# Patient Record
Sex: FEMALE | Race: WHITE | Employment: OTHER | URBAN - NONMETROPOLITAN AREA
[De-identification: names, ages, dates, MRNs, and addresses within clinical notes are randomized per-mention and may not be internally consistent; named-entity substitution may affect disease eponyms.]

---

## 2017-04-20 ENCOUNTER — TELEPHONE (OUTPATIENT)
Dept: OBGYN | Age: 62
End: 2017-04-20

## 2017-04-20 DIAGNOSIS — Z12.31 ENCOUNTER FOR SCREENING MAMMOGRAM FOR BREAST CANCER: Primary | ICD-10-CM

## 2017-05-23 ENCOUNTER — OFFICE VISIT (OUTPATIENT)
Dept: OBGYN | Age: 62
End: 2017-05-23
Payer: COMMERCIAL

## 2017-05-23 VITALS
DIASTOLIC BLOOD PRESSURE: 77 MMHG | WEIGHT: 197.5 LBS | BODY MASS INDEX: 31 KG/M2 | SYSTOLIC BLOOD PRESSURE: 122 MMHG | HEIGHT: 67 IN | HEART RATE: 62 BPM

## 2017-05-23 DIAGNOSIS — Z01.419 ENCOUNTER FOR GYNECOLOGICAL EXAMINATION (GENERAL) (ROUTINE) WITHOUT ABNORMAL FINDINGS: Primary | ICD-10-CM

## 2017-05-23 PROCEDURE — 99396 PREV VISIT EST AGE 40-64: CPT

## 2017-05-23 RX ORDER — FAMCICLOVIR 250 MG/1
TABLET, FILM COATED ORAL
COMMUNITY
Start: 2017-04-13

## 2017-05-23 RX ORDER — TOFACITINIB 11 MG/1
TABLET, FILM COATED, EXTENDED RELEASE ORAL
COMMUNITY
Start: 2017-04-14

## 2017-05-23 ASSESSMENT — ENCOUNTER SYMPTOMS
COLOR CHANGE: 0
CONSTIPATION: 0
DIARRHEA: 0
BACK PAIN: 0
TROUBLE SWALLOWING: 0
SHORTNESS OF BREATH: 0
BLOOD IN STOOL: 0
COUGH: 0

## 2017-06-08 ENCOUNTER — TRANSCRIBE ORDERS (OUTPATIENT)
Dept: ADMINISTRATIVE | Facility: HOSPITAL | Age: 62
End: 2017-06-08

## 2017-06-08 DIAGNOSIS — Z12.31 ENCOUNTER FOR SCREENING MAMMOGRAM FOR MALIGNANT NEOPLASM OF BREAST: Primary | ICD-10-CM

## 2017-06-13 ENCOUNTER — HOSPITAL ENCOUNTER (OUTPATIENT)
Dept: MAMMOGRAPHY | Facility: HOSPITAL | Age: 62
Discharge: HOME | End: 2017-06-13

## 2017-06-13 DIAGNOSIS — Z12.31 ENCOUNTER FOR SCREENING MAMMOGRAM FOR MALIGNANT NEOPLASM OF BREAST: ICD-10-CM

## 2017-06-13 PROCEDURE — 77063 BREAST TOMOSYNTHESIS BI: CPT

## 2017-06-13 PROCEDURE — G0202 SCR MAMMO BI INCL CAD: HCPCS

## 2018-08-31 ENCOUNTER — TRANSCRIBE ORDERS (OUTPATIENT)
Dept: ADMINISTRATIVE | Facility: HOSPITAL | Age: 63
End: 2018-08-31

## 2018-08-31 DIAGNOSIS — Z12.31 ENCOUNTER FOR SCREENING MAMMOGRAM FOR MALIGNANT NEOPLASM OF BREAST: Primary | ICD-10-CM

## 2018-09-07 ENCOUNTER — HOSPITAL ENCOUNTER (OUTPATIENT)
Dept: MAMMOGRAPHY | Facility: HOSPITAL | Age: 63
Discharge: HOME | End: 2018-09-07
Admitting: NURSE PRACTITIONER

## 2018-09-07 DIAGNOSIS — Z12.31 ENCOUNTER FOR SCREENING MAMMOGRAM FOR MALIGNANT NEOPLASM OF BREAST: ICD-10-CM

## 2018-09-07 PROCEDURE — 77063 BREAST TOMOSYNTHESIS BI: CPT

## 2018-09-07 PROCEDURE — 77067 SCR MAMMO BI INCL CAD: CPT

## 2018-10-01 ENCOUNTER — OFFICE VISIT (OUTPATIENT)
Dept: OBSTETRICS AND GYNECOLOGY | Facility: CLINIC | Age: 63
End: 2018-10-01

## 2018-10-01 VITALS
SYSTOLIC BLOOD PRESSURE: 112 MMHG | WEIGHT: 181 LBS | DIASTOLIC BLOOD PRESSURE: 68 MMHG | HEIGHT: 67 IN | BODY MASS INDEX: 28.41 KG/M2

## 2018-10-01 DIAGNOSIS — N89.8 VAGINAL DRYNESS: ICD-10-CM

## 2018-10-01 DIAGNOSIS — Z01.419 WELL WOMAN EXAM WITH ROUTINE GYNECOLOGICAL EXAM: Primary | ICD-10-CM

## 2018-10-01 DIAGNOSIS — F41.9 ANXIETY: ICD-10-CM

## 2018-10-01 DIAGNOSIS — N81.11 CYSTOCELE, MIDLINE: ICD-10-CM

## 2018-10-01 DIAGNOSIS — R21 RASH: ICD-10-CM

## 2018-10-01 DIAGNOSIS — Z80.0 FAMILY HISTORY OF COLON CANCER: ICD-10-CM

## 2018-10-01 PROCEDURE — 99396 PREV VISIT EST AGE 40-64: CPT | Performed by: NURSE PRACTITIONER

## 2018-10-01 PROCEDURE — 99203 OFFICE O/P NEW LOW 30 MIN: CPT | Performed by: NURSE PRACTITIONER

## 2018-10-01 PROCEDURE — G0123 SCREEN CERV/VAG THIN LAYER: HCPCS | Performed by: NURSE PRACTITIONER

## 2018-10-01 RX ORDER — BISOPROLOL FUMARATE AND HYDROCHLOROTHIAZIDE 5; 6.25 MG/1; MG/1
TABLET ORAL
COMMUNITY
Start: 2013-05-20

## 2018-10-01 RX ORDER — SIMVASTATIN 80 MG
TABLET ORAL
COMMUNITY
Start: 2013-09-25

## 2018-10-01 RX ORDER — NYSTATIN AND TRIAMCINOLONE ACETONIDE 100000; 1 [USP'U]/G; MG/G
OINTMENT TOPICAL 2 TIMES DAILY
Qty: 1 BOTTLE | Refills: 3 | Status: SHIPPED | OUTPATIENT
Start: 2018-10-01 | End: 2022-04-13

## 2018-10-01 RX ORDER — ERGOCALCIFEROL (VITAMIN D2) 10 MCG
TABLET ORAL
COMMUNITY

## 2018-10-01 RX ORDER — ALPRAZOLAM 0.25 MG/1
0.25 TABLET ORAL 3 TIMES DAILY PRN
Qty: 90 TABLET | Refills: 0 | Status: SHIPPED | OUTPATIENT
Start: 2018-10-01 | End: 2022-02-02

## 2018-10-01 RX ORDER — ESOMEPRAZOLE MAGNESIUM 40 MG/1
40 CAPSULE, DELAYED RELEASE ORAL
COMMUNITY

## 2018-10-01 RX ORDER — FERROUS SULFATE 325(65) MG
325 TABLET ORAL
COMMUNITY

## 2018-10-01 RX ORDER — LEVOTHYROXINE SODIUM 0.03 MG/1
TABLET ORAL
COMMUNITY
End: 2022-04-13

## 2018-10-01 RX ORDER — FAMCICLOVIR 250 MG/1
TABLET ORAL
COMMUNITY
Start: 2017-04-13 | End: 2022-02-02

## 2018-10-01 NOTE — PROGRESS NOTES
Subjective     Carissa Becerril is a 63 y.o. female    Here for yearly check up. She has C/O vaginal dryness. She also has a rash around the rectal area. She also has C/O insomnia and difficulty staying asleep. She has some anxiety associated with it.      Gynecologic Exam   The patient's primary symptoms include a genital rash. The patient's pertinent negatives include no pelvic pain, vaginal bleeding or vaginal discharge. Associated symptoms include rash. Pertinent negatives include no abdominal pain, anorexia, back pain, chills, constipation, diarrhea, discolored urine, dysuria, fever, flank pain, frequency, headaches, hematuria, joint pain, joint swelling, nausea, painful intercourse, sore throat, urgency or vomiting. She is sexually active. She is postmenopausal.   Other   Chronicity: Rash. The current episode started today. Associated symptoms include a rash. Pertinent negatives include no abdominal pain, anorexia, arthralgias, change in bowel habit, chest pain, chills, congestion, coughing, diaphoresis, fatigue, fever, headaches, joint swelling, myalgias, nausea, neck pain, numbness, sore throat, swollen glands, urinary symptoms, vertigo, visual change, vomiting or weakness. Nothing aggravates the symptoms. She has tried nothing for the symptoms.   Anxiety   Presents for initial visit. Onset was 1 to 6 months ago. The problem has been waxing and waning. Symptoms include insomnia. Patient reports no chest pain, compulsions, confusion, decreased concentration, depressed mood, dizziness, dry mouth, excessive worry, feeling of choking, hyperventilation, impotence, irritability, malaise, muscle tension, nausea, nervous/anxious behavior, obsessions, palpitations, panic, restlessness, shortness of breath or suicidal ideas. Symptoms occur most days. The severity of symptoms is mild. Nothing aggravates the symptoms. The quality of sleep is poor. Nighttime awakenings: several.     There are no known risk factors.  "        /68   Ht 170.2 cm (67\")   Wt 82.1 kg (181 lb)   LMP 10/01/2009 (Approximate)   BMI 28.35 kg/m²     Outpatient Encounter Prescriptions as of 10/1/2018   Medication Sig Dispense Refill   • aspirin 81 MG tablet Take 81 mg by mouth.     • bisoprolol-hydrochlorothiazide (ZIAC) 5-6.25 MG per tablet Take  by mouth.     • Calcium Carbonate 1500 (600 Ca) MG tablet Take 600 mg by mouth.     • esomeprazole (nexIUM) 40 MG capsule Take 40 mg by mouth.     • famciclovir (FAMVIR) 250 MG tablet      • ferrous sulfate 325 (65 FE) MG tablet Take 325 mg by mouth Daily With Breakfast.     • levothyroxine (SYNTHROID, LEVOTHROID) 25 MCG tablet Take  by mouth.     • simvastatin (ZOCOR) 80 MG tablet TAKE ONE-HALF TABLET BY MOUTH NIGHTLY     • Tofacitinib Citrate (XELJANZ XR) 11 MG tablet sustained-release 24 hour      • Vitamin D, Cholecalciferol, (CHOLECALCIFEROL) 400 units tablet Take  by mouth.     • ALPRAZolam (XANAX) 0.25 MG tablet Take 1 tablet by mouth 3 (Three) Times a Day As Needed for Anxiety. 90 tablet 0   • nystatin-triamcinolone (MYCOLOG) 804122-3.1 UNIT/GM-% ointment Apply  topically to the appropriate area as directed 2 (Two) Times a Day. 1 bottle 3     No facility-administered encounter medications on file as of 10/1/2018.        Surgical History  Past Surgical History:   Procedure Laterality Date   • COLONOSCOPY  2017   • DILATATION AND CURETTAGE     • TONSILLECTOMY     • TUBAL ABDOMINAL LIGATION     • WISDOM TOOTH EXTRACTION         Family History  Family History   Problem Relation Age of Onset   • Diabetes Father    • Heart disease Mother    • No Known Problems Brother    • Colon cancer Maternal Grandmother 80   • Colon cancer Cousin 18   • Breast cancer Neg Hx    • Ovarian cancer Neg Hx    • Uterine cancer Neg Hx    • Melanoma Neg Hx    • Prostate cancer Neg Hx        The following portions of the patient's history were reviewed and updated as appropriate: allergies, current medications, past family " history, past medical history, past social history, past surgical history and problem list.    Review of Systems   Constitutional: Negative for activity change, appetite change, chills, diaphoresis, fatigue, fever, irritability, unexpected weight gain and unexpected weight loss.   HENT: Negative for congestion, dental problem, drooling, ear discharge, ear pain, facial swelling, hearing loss, mouth sores, nosebleeds, postnasal drip, rhinorrhea, sinus pressure, sneezing, sore throat, tinnitus, trouble swallowing and voice change.    Eyes: Negative for blurred vision, double vision, photophobia, pain, discharge, redness, itching and visual disturbance.   Respiratory: Negative for apnea, cough, choking, chest tightness, shortness of breath, wheezing and stridor.    Cardiovascular: Negative for chest pain, palpitations and leg swelling.   Gastrointestinal: Negative for abdominal distention, abdominal pain, anal bleeding, anorexia, blood in stool, change in bowel habit, constipation, diarrhea, nausea, rectal pain, vomiting, GERD and indigestion.   Endocrine: Negative for cold intolerance, heat intolerance, polydipsia, polyphagia and polyuria.   Genitourinary: Negative for breast discharge, urinary incontinence, decreased libido, decreased urine volume, difficulty urinating, dyspareunia, dysuria, flank pain, frequency, genital sores, hematuria, impotence, menstrual problem, pelvic pain, urgency, vaginal bleeding, vaginal discharge, vaginal pain, breast lump and breast pain.   Musculoskeletal: Negative for arthralgias, back pain, gait problem, joint pain, joint swelling, myalgias, neck pain, neck stiffness and bursitis.   Skin: Positive for rash. Negative for color change, dry skin, pallor, skin lesions and bruise.   Allergic/Immunologic: Negative for environmental allergies, food allergies and immunocompromised state.   Neurological: Negative for dizziness, vertigo, tremors, seizures, syncope, facial asymmetry, speech  difficulty, weakness, light-headedness, numbness, headache, memory problem and confusion.   Hematological: Negative for adenopathy. Does not bruise/bleed easily.   Psychiatric/Behavioral: Positive for sleep disturbance. Negative for agitation, behavioral problems, decreased concentration, dysphoric mood, hallucinations, self-injury, suicidal ideas, negative for hyperactivity, depressed mood and stress. The patient has insomnia. The patient is not nervous/anxious.        Objective   Physical Exam   Constitutional: She is oriented to person, place, and time. She appears well-developed and well-nourished. No distress.   HENT:   Head: Normocephalic.   Right Ear: External ear normal.   Left Ear: External ear normal.   Nose: Nose normal.   Mouth/Throat: Oropharynx is clear and moist.   Eyes: Conjunctivae are normal. Right eye exhibits no discharge. Left eye exhibits no discharge. No scleral icterus.   Neck: Normal range of motion. Neck supple. Carotid bruit is not present. No tracheal deviation present. No thyromegaly present.   Cardiovascular: Normal rate, regular rhythm, normal heart sounds and intact distal pulses.    No murmur heard.  Pulmonary/Chest: Effort normal and breath sounds normal. No respiratory distress. She has no wheezes. Right breast exhibits no inverted nipple, no mass, no nipple discharge, no skin change and no tenderness. Left breast exhibits no inverted nipple, no mass, no nipple discharge, no skin change and no tenderness. Breasts are symmetrical. There is no breast swelling.   Abdominal: Soft. She exhibits no distension and no mass. There is no tenderness. There is no guarding. No hernia. Hernia confirmed negative in the right inguinal area and confirmed negative in the left inguinal area.   Genitourinary: Rectum normal, vagina normal and uterus normal. Rectal exam shows no mass.       No breast tenderness, discharge or bleeding. Pelvic exam was performed with patient supine. There is no rash,  tenderness, lesion or injury on the right labia. There is no rash, tenderness, lesion or injury on the left labia. Uterus is not enlarged, not fixed and not tender. Cervix exhibits no motion tenderness, no discharge and no friability. Right adnexum displays no mass, no tenderness and no fullness. Left adnexum displays no mass, no tenderness and no fullness. No erythema, tenderness or bleeding in the vagina. No foreign body in the vagina. No signs of injury around the vagina. No vaginal discharge found.   Genitourinary Comments:   BSU normal  Urethral meatus  Normal  Perineum  Normal   Musculoskeletal: Normal range of motion. She exhibits no edema or tenderness.   Lymphadenopathy:        Head (right side): No submental, no submandibular, no tonsillar, no preauricular, no posterior auricular and no occipital adenopathy present.        Head (left side): No submental, no submandibular, no tonsillar, no preauricular, no posterior auricular and no occipital adenopathy present.     She has no cervical adenopathy.        Right cervical: No superficial cervical, no deep cervical and no posterior cervical adenopathy present.       Left cervical: No superficial cervical, no deep cervical and no posterior cervical adenopathy present.     She has no axillary adenopathy.        Right: No inguinal adenopathy present.        Left: No inguinal adenopathy present.   Neurological: She is alert and oriented to person, place, and time. Coordination normal.   Skin: Skin is warm and dry. No bruising and no rash noted. She is not diaphoretic. No erythema.   Psychiatric: She has a normal mood and affect. Her behavior is normal. Judgment and thought content normal.   Nursing note and vitals reviewed.      Assessment/Plan   Carissa was seen today for gynecologic exam.    Diagnoses and all orders for this visit:    Well woman exam with routine gynecological exam  Normal GYN exam. Will have lab work at PCP.  Encouraged SBE, pt is aware how to do  self breast exam and the importance of same. Discussed weight management and importance of maintaining a healthy weight. Discussed Vitamin D intake and the importance of adequate vitamin D for both Bone Health and a healthy immune system.  Discussed Daily exercise and the importance of same, in regards to a healthy heart as well as helping to maintain her weight and improving her mental health.  BMI  28.3. Colonoscopy is up to date.  Mammogram was already done. Pap smear is done per [facility]CCP guidelines.  -     Liquid-based Pap Smear, Screening    Vaginal dryness  Comments:  Pt will try Coconut Oil daily.    Family history of colon cancer  Pt has a family history of colon  Cancer. We discussed [facility] Genetic screening that can be done to see if she carries the Gene for Breast, Ovarian, Colon, Melanoma, Uterine and Pancreatic Cancers. We discussed if positive she will have free Genetic counseling through [facility]. We also discussed if she does have the positive gene she can have more testing yearly and even surgery if desired.    Rash  Comments:  Pt has a rash in the groin folds and around her buttocks. Will try Mycolog cream.  Orders:  -     nystatin-triamcinolone (MYCOLOG) 146073-6.1 UNIT/GM-% ointment; Apply  topically to the appropriate area as directed 2 (Two) Times a Day.    Anxiety  Comments:  Pt has some anxiety at night and has difficulty staying asleep. Has tried Ambien in the past and it did not help. Will try low dose Xanax. jonny obtained and S  Orders:  -     ALPRAZolam (XANAX) 0.25 MG tablet; Take 1 tablet by mouth 3 (Three) Times a Day As Needed for Anxiety.    Cystocele, midline  Comments:  Pt has a mild cystocele. Encouraged Kegel exercises.          Patient's Body mass index is 28.35 kg/m². BMI is above normal parameters. Recommendations include: educational material, exercise counseling and nutrition counseling.      Patria Nguyen, APRN  10/1/2018

## 2018-10-01 NOTE — PATIENT INSTRUCTIONS

## 2018-10-01 NOTE — PROGRESS NOTES
Attempted to obtain health maintenance information, patient unable to provide answers for the following items Pneumococcal Vaccine, Medicare Annual Wellness Exam, Lipid Panel, Diabetic Eye Exam, Diabetic Foot Exam, HPV Vaccine, Chlamydia Screening , HgbA1c and Zoster Vaccine.

## 2018-10-02 ENCOUNTER — TELEPHONE (OUTPATIENT)
Dept: OBSTETRICS AND GYNECOLOGY | Facility: CLINIC | Age: 63
End: 2018-10-02

## 2018-10-02 LAB
GEN CATEG CVX/VAG CYTO-IMP: NORMAL
LAB AP CASE REPORT: NORMAL
LAB AP GYN ADDITIONAL INFORMATION: NORMAL
LAB AP GYN OTHER FINDINGS: NORMAL
PATH INTERP SPEC-IMP: NORMAL
STAT OF ADQ CVX/VAG CYTO-IMP: NORMAL

## 2018-10-02 NOTE — TELEPHONE ENCOUNTER
----- Message from CAMILLE Godfrey sent at 10/2/2018  2:38 PM CDT -----  Normal pap. TRC  ---Pt informed pap normal.

## 2019-12-13 ENCOUNTER — TRANSCRIBE ORDERS (OUTPATIENT)
Dept: ADMINISTRATIVE | Facility: HOSPITAL | Age: 64
End: 2019-12-13

## 2019-12-13 DIAGNOSIS — Z12.31 SCREENING MAMMOGRAM FOR HIGH-RISK PATIENT: Primary | ICD-10-CM

## 2019-12-19 ENCOUNTER — APPOINTMENT (OUTPATIENT)
Dept: MAMMOGRAPHY | Facility: HOSPITAL | Age: 64
End: 2019-12-19

## 2019-12-23 ENCOUNTER — HOSPITAL ENCOUNTER (OUTPATIENT)
Dept: MAMMOGRAPHY | Facility: HOSPITAL | Age: 64
Discharge: HOME | End: 2019-12-23
Admitting: NURSE PRACTITIONER

## 2019-12-23 PROCEDURE — 77067 SCR MAMMO BI INCL CAD: CPT

## 2019-12-23 PROCEDURE — 77063 BREAST TOMOSYNTHESIS BI: CPT

## 2020-01-02 ENCOUNTER — OFFICE VISIT (OUTPATIENT)
Dept: OBSTETRICS AND GYNECOLOGY | Facility: CLINIC | Age: 65
End: 2020-01-02

## 2020-01-02 VITALS
HEIGHT: 67 IN | DIASTOLIC BLOOD PRESSURE: 76 MMHG | BODY MASS INDEX: 32.65 KG/M2 | WEIGHT: 208 LBS | SYSTOLIC BLOOD PRESSURE: 136 MMHG

## 2020-01-02 DIAGNOSIS — N81.4 CYSTOCELE WITH PROLAPSE: ICD-10-CM

## 2020-01-02 DIAGNOSIS — N89.8 VAGINAL DRYNESS: ICD-10-CM

## 2020-01-02 DIAGNOSIS — Z12.31 ENCOUNTER FOR SCREENING MAMMOGRAM FOR BREAST CANCER: ICD-10-CM

## 2020-01-02 DIAGNOSIS — M05.9 RHEUMATOID ARTHRITIS WITH POSITIVE RHEUMATOID FACTOR, INVOLVING UNSPECIFIED SITE (HCC): ICD-10-CM

## 2020-01-02 DIAGNOSIS — Z01.419 WELL WOMAN EXAM WITH ROUTINE GYNECOLOGICAL EXAM: Primary | ICD-10-CM

## 2020-01-02 PROCEDURE — G0123 SCREEN CERV/VAG THIN LAYER: HCPCS | Performed by: NURSE PRACTITIONER

## 2020-01-02 PROCEDURE — 99396 PREV VISIT EST AGE 40-64: CPT | Performed by: NURSE PRACTITIONER

## 2020-01-02 PROCEDURE — 87624 HPV HI-RISK TYP POOLED RSLT: CPT | Performed by: NURSE PRACTITIONER

## 2020-01-02 RX ORDER — SUVOREXANT 15 MG/1
1 TABLET, FILM COATED ORAL NIGHTLY PRN
COMMUNITY
Start: 2019-10-23 | End: 2022-04-13

## 2020-01-02 RX ORDER — DICLOFENAC SODIUM 75 MG/1
75 TABLET, DELAYED RELEASE ORAL 2 TIMES DAILY
COMMUNITY
Start: 2019-12-14 | End: 2020-01-02

## 2020-01-02 NOTE — PATIENT INSTRUCTIONS

## 2020-01-02 NOTE — PROGRESS NOTES
"Subjective     Carissa Becerril is a 64 y.o. female    Patient comes in today for yearly checkup.  She has no complaints.    Gynecologic Exam   The patient's pertinent negatives include no pelvic pain, vaginal bleeding or vaginal discharge. The patient is experiencing no pain. Pertinent negatives include no abdominal pain, anorexia, back pain, chills, constipation, diarrhea, discolored urine, dysuria, fever, flank pain, frequency, headaches, hematuria, joint pain, joint swelling, nausea, painful intercourse, rash, sore throat, urgency or vomiting. She is sexually active. She is postmenopausal.         /76   Ht 170.2 cm (67\")   Wt 94.3 kg (208 lb)   LMP 10/01/2009 (Approximate)   Breastfeeding No   BMI 32.58 kg/m²     Outpatient Encounter Medications as of 1/2/2020   Medication Sig Dispense Refill   • aspirin 81 MG tablet Take 81 mg by mouth.     • BELSOMRA 15 MG tablet Take 1 tablet by mouth At Night As Needed.     • bisoprolol-hydrochlorothiazide (ZIAC) 5-6.25 MG per tablet Take  by mouth.     • Calcium Carbonate 1500 (600 Ca) MG tablet Take 600 mg by mouth.     • esomeprazole (nexIUM) 40 MG capsule Take 40 mg by mouth.     • ferrous sulfate 325 (65 FE) MG tablet Take 325 mg by mouth Daily With Breakfast.     • levothyroxine (SYNTHROID, LEVOTHROID) 25 MCG tablet Take  by mouth.     • Multiple Vitamins-Minerals (PRESERVISION AREDS 2+MULTI VIT PO) Take  by mouth.     • simvastatin (ZOCOR) 80 MG tablet TAKE ONE-HALF TABLET BY MOUTH NIGHTLY     • Tofacitinib Citrate (XELJANZ XR) 11 MG tablet sustained-release 24 hour      • Vitamin D, Cholecalciferol, (CHOLECALCIFEROL) 400 units tablet Take  by mouth.     • ALPRAZolam (XANAX) 0.25 MG tablet Take 1 tablet by mouth 3 (Three) Times a Day As Needed for Anxiety. 90 tablet 0   • famciclovir (FAMVIR) 250 MG tablet      • nystatin-triamcinolone (MYCOLOG) 195523-0.1 UNIT/GM-% ointment Apply  topically to the appropriate area as directed 2 (Two) Times a Day. 1 bottle 3 "   • [DISCONTINUED] diclofenac (VOLTAREN) 75 MG EC tablet Take 75 mg by mouth 2 (Two) Times a Day.       No facility-administered encounter medications on file as of 1/2/2020.        Surgical History  Past Surgical History:   Procedure Laterality Date   • COLONOSCOPY  2017   • DILATATION AND CURETTAGE     • TONSILLECTOMY     • TUBAL ABDOMINAL LIGATION     • WISDOM TOOTH EXTRACTION         Family History  Family History   Problem Relation Age of Onset   • Diabetes Father    • Heart disease Mother    • No Known Problems Brother    • Colon cancer Maternal Grandmother 80   • Colon cancer Cousin 18   • Breast cancer Neg Hx    • Ovarian cancer Neg Hx    • Uterine cancer Neg Hx    • Melanoma Neg Hx    • Prostate cancer Neg Hx        The following portions of the patient's history were reviewed and updated as appropriate: allergies, current medications, past family history, past medical history, past social history, past surgical history and problem list.    Review of Systems   Constitutional: Negative for activity change, appetite change, chills, diaphoresis, fatigue, fever, unexpected weight gain and unexpected weight loss.   HENT: Negative for congestion, dental problem, drooling, ear discharge, ear pain, facial swelling, hearing loss, mouth sores, nosebleeds, postnasal drip, rhinorrhea, sinus pressure, sneezing, sore throat, swollen glands, tinnitus, trouble swallowing and voice change.    Eyes: Negative for blurred vision, double vision, photophobia, pain, discharge, redness, itching and visual disturbance.   Respiratory: Negative for apnea, cough, choking, chest tightness, shortness of breath, wheezing and stridor.    Cardiovascular: Negative for chest pain, palpitations and leg swelling.   Gastrointestinal: Negative for abdominal distention, abdominal pain, anal bleeding, anorexia, blood in stool, constipation, diarrhea, nausea, rectal pain, vomiting, GERD and indigestion.   Endocrine: Negative for cold intolerance,  heat intolerance, polydipsia, polyphagia and polyuria.   Genitourinary: Negative for amenorrhea, breast discharge, breast lump, breast pain, decreased libido, decreased urine volume, difficulty urinating, dyspareunia, dysuria, flank pain, frequency, genital sores, hematuria, menstrual problem, pelvic pain, pelvic pressure, urgency, urinary incontinence, vaginal bleeding, vaginal discharge and vaginal pain.   Musculoskeletal: Negative for arthralgias, back pain, gait problem, joint pain, joint swelling, myalgias, neck pain, neck stiffness and bursitis.   Skin: Negative for color change, dry skin and rash.   Allergic/Immunologic: Negative for environmental allergies, food allergies and immunocompromised state.   Neurological: Negative for dizziness, tremors, seizures, syncope, facial asymmetry, speech difficulty, weakness, light-headedness, numbness, headache, memory problem and confusion.   Hematological: Negative for adenopathy. Does not bruise/bleed easily.   Psychiatric/Behavioral: Negative for agitation, behavioral problems, decreased concentration, dysphoric mood, hallucinations, self-injury, sleep disturbance, suicidal ideas, negative for hyperactivity, depressed mood and stress. The patient is not nervous/anxious.        Objective   Physical Exam   Constitutional: She is oriented to person, place, and time. She appears well-developed and well-nourished. No distress.   HENT:   Head: Normocephalic.   Right Ear: External ear normal.   Left Ear: External ear normal.   Nose: Nose normal.   Mouth/Throat: Oropharynx is clear and moist.   Eyes: Conjunctivae are normal. Right eye exhibits no discharge. Left eye exhibits no discharge. No scleral icterus.   Neck: Normal range of motion. Neck supple. Carotid bruit is not present. No tracheal deviation present. No thyromegaly present.   Cardiovascular: Normal rate, regular rhythm, normal heart sounds and intact distal pulses.   No murmur heard.  Pulmonary/Chest: Effort  normal and breath sounds normal. No respiratory distress. She has no wheezes. Right breast exhibits no inverted nipple, no mass, no nipple discharge, no skin change and no tenderness. Left breast exhibits no inverted nipple, no mass, no nipple discharge, no skin change and no tenderness. No breast swelling, tenderness, discharge or bleeding. Breasts are symmetrical.   Abdominal: Soft. She exhibits no distension and no mass. There is no tenderness. There is no guarding. No hernia. Hernia confirmed negative in the right inguinal area and confirmed negative in the left inguinal area.   Genitourinary: Rectum normal, vagina normal and uterus normal. Rectal exam shows no mass. No breast swelling, tenderness, discharge or bleeding. Pelvic exam was performed with patient supine. There is no rash, tenderness, lesion or injury on the right labia. There is no rash, tenderness, lesion or injury on the left labia. Uterus is not enlarged, not fixed and not tender. Cervix exhibits no motion tenderness, no discharge and no friability. Right adnexum displays no mass, no tenderness and no fullness. Left adnexum displays no mass, no tenderness and no fullness. No erythema, tenderness or bleeding in the vagina. No foreign body in the vagina. No signs of injury around the vagina. No vaginal discharge found.   Genitourinary Comments:   BSU normal  Urethral meatus  Normal  Perineum  Normal  Patient has a moderate cystocele   Musculoskeletal: Normal range of motion. She exhibits no edema or tenderness.   Lymphadenopathy:        Head (right side): No submental, no submandibular, no tonsillar, no preauricular, no posterior auricular and no occipital adenopathy present.        Head (left side): No submental, no submandibular, no tonsillar, no preauricular, no posterior auricular and no occipital adenopathy present.     She has no cervical adenopathy.        Right cervical: No superficial cervical, no deep cervical and no posterior cervical  adenopathy present.       Left cervical: No superficial cervical, no deep cervical and no posterior cervical adenopathy present.     She has no axillary adenopathy.        Right: No inguinal adenopathy present.        Left: No inguinal adenopathy present.   Neurological: She is alert and oriented to person, place, and time. Coordination normal.   Skin: Skin is warm and dry. No bruising and no rash noted. She is not diaphoretic. No erythema.   Psychiatric: She has a normal mood and affect. Her behavior is normal. Judgment and thought content normal.   Nursing note and vitals reviewed.      Assessment/Plan   Carissa was seen today for gynecologic exam.    Diagnoses and all orders for this visit:    Well woman exam with routine gynecological exam  Normal GYN exam. Will have lab work at PCP. Encouraged SBE, pt is aware how to do self breast exam and the importance of same. Discussed weight management and importance of maintaining a healthy weight. Discussed Vitamin D intake and the importance of adequate vitamin D for both Bone Health and a healthy immune system.  Discussed Daily exercise and the importance of same, in regards to a healthy heart as well as helping to maintain her weight and improving her mental health.  BMI 32.6.  Colonoscopy is up to date.  Mammogram will be scheduled at [facility].  Pap smear is done per [facility]P guidelines.  -     Liquid-based Pap Smear, Screening    Encounter for screening mammogram for breast cancer  Comments:  Mammogram will be done at [facility].    Vaginal dryness  Comments:  Patient is encouraged to use coconut oil daily.    Rheumatoid arthritis with positive rheumatoid factor, involving unspecified site (CMS/[facility])  Comments:  Sees  in [facility]. Has Bone Density done with him.    Cystocele with prolapse  Comments:  She is encouraged to continue Kegel exercises.  We again discussed Angelina Coronel.         Patient's Body mass index is 32.58 kg/m². BMI is above normal  parameters. Recommendations include: educational material, exercise counseling and nutrition counseling.      Patria Nguyen, APRN  1/2/2020

## 2020-01-06 LAB
GEN CATEG CVX/VAG CYTO-IMP: NORMAL
HPV I/H RISK 4 DNA CVX QL PROBE+SIG AMP: NOT DETECTED
LAB AP CASE REPORT: NORMAL
LAB AP GYN ADDITIONAL INFORMATION: NORMAL
LAB AP GYN OTHER FINDINGS: NORMAL
PATH INTERP SPEC-IMP: NORMAL
STAT OF ADQ CVX/VAG CYTO-IMP: NORMAL

## 2020-12-29 ENCOUNTER — OFFICE VISIT (OUTPATIENT)
Dept: WOUND CARE | Facility: HOSPITAL | Age: 65
End: 2020-12-29

## 2020-12-29 PROCEDURE — G0463 HOSPITAL OUTPT CLINIC VISIT: HCPCS

## 2020-12-29 PROCEDURE — 17250 CHEM CAUT OF GRANLTJ TISSUE: CPT

## 2020-12-29 PROCEDURE — 97597 DBRDMT OPN WND 1ST 20 CM/<: CPT | Performed by: NURSE PRACTITIONER

## 2020-12-29 PROCEDURE — 99213 OFFICE O/P EST LOW 20 MIN: CPT | Performed by: NURSE PRACTITIONER

## 2020-12-29 PROCEDURE — 17250 CHEM CAUT OF GRANLTJ TISSUE: CPT | Performed by: NURSE PRACTITIONER

## 2021-01-04 ENCOUNTER — TRANSCRIBE ORDERS (OUTPATIENT)
Dept: ADMINISTRATIVE | Facility: HOSPITAL | Age: 66
End: 2021-01-04

## 2021-01-04 DIAGNOSIS — Z12.31 SCREENING MAMMOGRAM, ENCOUNTER FOR: Primary | ICD-10-CM

## 2021-01-05 ENCOUNTER — OFFICE VISIT (OUTPATIENT)
Dept: WOUND CARE | Facility: HOSPITAL | Age: 66
End: 2021-01-05

## 2021-01-05 PROCEDURE — 11042 DBRDMT SUBQ TIS 1ST 20SQCM/<: CPT | Performed by: NURSE PRACTITIONER

## 2021-01-12 ENCOUNTER — OFFICE VISIT (OUTPATIENT)
Dept: WOUND CARE | Facility: HOSPITAL | Age: 66
End: 2021-01-12

## 2021-01-12 ENCOUNTER — HOSPITAL ENCOUNTER (OUTPATIENT)
Dept: MAMMOGRAPHY | Facility: HOSPITAL | Age: 66
Discharge: HOME | End: 2021-01-12
Admitting: NURSE PRACTITIONER

## 2021-01-12 PROCEDURE — 77067 SCR MAMMO BI INCL CAD: CPT

## 2021-01-12 PROCEDURE — 97597 DBRDMT OPN WND 1ST 20 CM/<: CPT | Performed by: NURSE PRACTITIONER

## 2021-01-12 PROCEDURE — 77063 BREAST TOMOSYNTHESIS BI: CPT

## 2021-01-12 PROCEDURE — 11042 DBRDMT SUBQ TIS 1ST 20SQCM/<: CPT | Performed by: NURSE PRACTITIONER

## 2021-01-19 ENCOUNTER — OFFICE VISIT (OUTPATIENT)
Dept: WOUND CARE | Facility: HOSPITAL | Age: 66
End: 2021-01-19

## 2021-01-19 PROCEDURE — 11042 DBRDMT SUBQ TIS 1ST 20SQCM/<: CPT | Performed by: NURSE PRACTITIONER

## 2021-01-19 PROCEDURE — 97597 DBRDMT OPN WND 1ST 20 CM/<: CPT | Performed by: NURSE PRACTITIONER

## 2021-01-26 ENCOUNTER — OFFICE VISIT (OUTPATIENT)
Dept: WOUND CARE | Facility: HOSPITAL | Age: 66
End: 2021-01-26

## 2021-01-26 PROCEDURE — 97597 DBRDMT OPN WND 1ST 20 CM/<: CPT | Performed by: NURSE PRACTITIONER

## 2021-02-03 ENCOUNTER — OFFICE VISIT (OUTPATIENT)
Dept: WOUND CARE | Facility: HOSPITAL | Age: 66
End: 2021-02-03

## 2021-02-03 PROCEDURE — 11042 DBRDMT SUBQ TIS 1ST 20SQCM/<: CPT | Performed by: NURSE PRACTITIONER

## 2021-02-10 ENCOUNTER — OFFICE VISIT (OUTPATIENT)
Dept: WOUND CARE | Facility: HOSPITAL | Age: 66
End: 2021-02-10

## 2021-02-10 PROCEDURE — 97597 DBRDMT OPN WND 1ST 20 CM/<: CPT | Performed by: NURSE PRACTITIONER

## 2021-02-17 ENCOUNTER — OFFICE VISIT (OUTPATIENT)
Dept: WOUND CARE | Facility: HOSPITAL | Age: 66
End: 2021-02-17

## 2021-02-17 PROCEDURE — 97597 DBRDMT OPN WND 1ST 20 CM/<: CPT | Performed by: NURSE PRACTITIONER

## 2021-02-24 ENCOUNTER — OFFICE VISIT (OUTPATIENT)
Dept: WOUND CARE | Facility: HOSPITAL | Age: 66
End: 2021-02-24

## 2021-02-24 PROCEDURE — 97597 DBRDMT OPN WND 1ST 20 CM/<: CPT | Performed by: NURSE PRACTITIONER

## 2021-02-24 PROCEDURE — 11042 DBRDMT SUBQ TIS 1ST 20SQCM/<: CPT | Performed by: NURSE PRACTITIONER

## 2021-03-03 ENCOUNTER — OFFICE VISIT (OUTPATIENT)
Dept: WOUND CARE | Facility: HOSPITAL | Age: 66
End: 2021-03-03

## 2021-03-03 DIAGNOSIS — S81.852D OPEN BITE, LEFT LOWER LEG, SUBSEQUENT ENCOUNTER: ICD-10-CM

## 2021-03-03 DIAGNOSIS — M79.669 PAIN OF LOWER LEG, UNSPECIFIED LATERALITY: ICD-10-CM

## 2021-03-03 PROCEDURE — 97597 DBRDMT OPN WND 1ST 20 CM/<: CPT | Performed by: NURSE PRACTITIONER

## 2021-03-15 ENCOUNTER — OFFICE VISIT (OUTPATIENT)
Dept: WOUND CARE | Facility: HOSPITAL | Age: 66
End: 2021-03-15

## 2021-03-15 DIAGNOSIS — M06.9 RHEUMATOID ARTHRITIS, INVOLVING UNSPECIFIED SITE, UNSPECIFIED WHETHER RHEUMATOID FACTOR PRESENT (HCC): ICD-10-CM

## 2021-03-15 DIAGNOSIS — M79.669 PAIN OF LOWER LEG, UNSPECIFIED LATERALITY: ICD-10-CM

## 2021-03-15 DIAGNOSIS — S81.852D OPEN BITE, LEFT LOWER LEG, SUBSEQUENT ENCOUNTER: ICD-10-CM

## 2021-03-15 PROCEDURE — 99213 OFFICE O/P EST LOW 20 MIN: CPT | Performed by: NURSE PRACTITIONER

## 2021-03-15 PROCEDURE — G0463 HOSPITAL OUTPT CLINIC VISIT: HCPCS

## 2021-03-29 ENCOUNTER — APPOINTMENT (OUTPATIENT)
Dept: WOUND CARE | Facility: HOSPITAL | Age: 66
End: 2021-03-29

## 2022-01-04 ENCOUNTER — TRANSCRIBE ORDERS (OUTPATIENT)
Dept: ADMINISTRATIVE | Facility: HOSPITAL | Age: 67
End: 2022-01-04

## 2022-01-04 DIAGNOSIS — Z12.31 ENCOUNTER FOR SCREENING MAMMOGRAM FOR MALIGNANT NEOPLASM OF BREAST: Primary | ICD-10-CM

## 2022-01-19 ENCOUNTER — HOSPITAL ENCOUNTER (OUTPATIENT)
Dept: MAMMOGRAPHY | Facility: HOSPITAL | Age: 67
Discharge: HOME | End: 2022-01-19
Admitting: NURSE PRACTITIONER

## 2022-01-19 DIAGNOSIS — Z12.31 ENCOUNTER FOR SCREENING MAMMOGRAM FOR MALIGNANT NEOPLASM OF BREAST: ICD-10-CM

## 2022-01-19 PROCEDURE — 77067 SCR MAMMO BI INCL CAD: CPT

## 2022-01-19 PROCEDURE — 77063 BREAST TOMOSYNTHESIS BI: CPT

## 2022-01-29 VITALS
WEIGHT: 190 LBS | SYSTOLIC BLOOD PRESSURE: 171 MMHG | TEMPERATURE: 97.9 F | DIASTOLIC BLOOD PRESSURE: 75 MMHG | BODY MASS INDEX: 29.82 KG/M2 | OXYGEN SATURATION: 99 % | HEART RATE: 96 BPM | HEIGHT: 67 IN | RESPIRATION RATE: 18 BRPM

## 2022-01-29 PROCEDURE — 99282 EMERGENCY DEPT VISIT SF MDM: CPT

## 2022-01-30 ENCOUNTER — APPOINTMENT (OUTPATIENT)
Dept: CT IMAGING | Facility: HOSPITAL | Age: 67
End: 2022-01-30

## 2022-01-30 ENCOUNTER — HOSPITAL ENCOUNTER (EMERGENCY)
Facility: HOSPITAL | Age: 67
Discharge: HOME | End: 2022-01-30
Attending: STUDENT IN AN ORGANIZED HEALTH CARE EDUCATION/TRAINING PROGRAM | Admitting: STUDENT IN AN ORGANIZED HEALTH CARE EDUCATION/TRAINING PROGRAM

## 2022-01-30 DIAGNOSIS — S02.2XXA CLOSED FRACTURE OF NASAL BONE, INITIAL ENCOUNTER: Primary | ICD-10-CM

## 2022-01-30 DIAGNOSIS — S01.21XA LACERATION OF NOSE WITHOUT COMPLICATION, INITIAL ENCOUNTER: ICD-10-CM

## 2022-01-30 PROCEDURE — 70486 CT MAXILLOFACIAL W/O DYE: CPT

## 2022-01-30 PROCEDURE — 70450 CT HEAD/BRAIN W/O DYE: CPT

## 2022-01-30 RX ORDER — AMOXICILLIN AND CLAVULANATE POTASSIUM 875; 125 MG/1; MG/1
1 TABLET, FILM COATED ORAL 2 TIMES DAILY
Qty: 14 TABLET | Refills: 0 | Status: SHIPPED | OUTPATIENT
Start: 2022-01-30 | End: 2022-02-06

## 2022-01-30 RX ORDER — OXYMETAZOLINE HYDROCHLORIDE 0.05 G/100ML
2 SPRAY NASAL 2 TIMES DAILY
Status: DISCONTINUED | OUTPATIENT
Start: 2022-01-30 | End: 2022-01-30 | Stop reason: HOSPADM

## 2022-01-30 RX ORDER — HYDROCODONE BITARTRATE AND ACETAMINOPHEN 5; 325 MG/1; MG/1
1 TABLET ORAL 4 TIMES DAILY PRN
Qty: 9 TABLET | Refills: 0 | Status: SHIPPED | OUTPATIENT
Start: 2022-01-30 | End: 2022-02-02

## 2022-01-30 RX ORDER — LIDOCAINE HYDROCHLORIDE AND EPINEPHRINE BITARTRATE 20; .01 MG/ML; MG/ML
10 INJECTION, SOLUTION SUBCUTANEOUS ONCE
Status: DISCONTINUED | OUTPATIENT
Start: 2022-01-30 | End: 2022-01-30 | Stop reason: HOSPADM

## 2022-02-02 ENCOUNTER — OFFICE VISIT (OUTPATIENT)
Dept: OTOLARYNGOLOGY | Facility: CLINIC | Age: 67
End: 2022-02-02

## 2022-02-02 VITALS
DIASTOLIC BLOOD PRESSURE: 79 MMHG | BODY MASS INDEX: 33.2 KG/M2 | SYSTOLIC BLOOD PRESSURE: 154 MMHG | TEMPERATURE: 97.2 F | WEIGHT: 212 LBS | HEART RATE: 74 BPM

## 2022-02-02 DIAGNOSIS — S02.2XXA CLOSED FRACTURE OF NASAL BONE, INITIAL ENCOUNTER: Primary | ICD-10-CM

## 2022-02-02 PROCEDURE — 99213 OFFICE O/P EST LOW 20 MIN: CPT | Performed by: EMERGENCY MEDICINE

## 2022-02-02 NOTE — PROGRESS NOTES
CAMILLE Gonzalez  [facility] EAR NOSE & THROAT  [address], [address]  [place] [address]  Fax [phone]  Phone [phone]      Visit Type: NEW PATIENT   Chief Complaint   Patient presents with   • Sinus Problem     nasal fracture        HPI  She complains of nasal congestion and nasal fracture. The symptoms are not localized to a particular location. The patient has had moderate symptoms. The symptoms have been relatively constant for the last 3 days. The symptoms are aggravated by  a fall. There have been no factors that have improved the symptoms.      History     Last Reviewed by Kaitlynn Palacios APRN on 2/2/2022 at 10:36 AM    Sections Reviewed    Tobacco, Family, Medical, Surgical      Problem list reviewed by Kaitlynn Palacios APRN on 2/2/2022 at 10:36 AM  Medicines reviewed by Kaitlynn Palacios APRN on 2/2/2022 at 10:36 AM  Medicines reviewed by Kaitlynn Palacios APRN on 2/2/2022 at 10:36 AM  Allergies reviewed by Kaitlynn Palacios APRN on 2/2/2022 at 10:34 AM  Allergies reviewed by Kaitlynn Palaciso APRN on 2/2/2022 at 10:36 AM        Vital Signs:   Temp:  [97.2 °F (36.2 °C)] 97.2 °F (36.2 °C)  Heart Rate:  [74] 74  BP: (154)/(79) 154/79  ENT Physical Exam  Constitutional  Appearance: patient appears well-developed, well-nourished and well-groomed,  Communication/Voice: communication appropriate for developmental age; vocal quality normal;  Head and Face  Palpation: sinus tenderness present;  Nose  External Nose: nasal discharge and nasal fracture visible;  Internal Nose: nasal obstruction present; bilateral intranasal mucosa edematous and erythematous; nasal septal deviation present; bilateral inferior turbinates with hypertrophy;  Nose comments: External edema and ecchymosis  Oral Cavity/Oropharynx  Lips: normal;  Teeth: normal;  Gums: gingiva normal;  Tongue: normal;  Oral mucosa: normal;  Hard palate: normal;          Result Review    RESULTS REVIEW    I have reviewed the patients old records in the chart.     Assessment/Plan    Diagnoses and all orders for this visit:    1. Closed fracture of nasal bone, initial encounter (Primary)            Saline nasal spray or saline gel to nose three times a day and as needed. Use a bedside humidifier at night. Place Vasoline in nose in the morning and at night.  Stop all over the counter decongestant nasal sprays, (Afrin, Vicks, Neosynephrine, etc.). Continued, regular use of these medications can cause rebound congestion and nasal obstruction.  Sinus rinses 3-4 times daily, Ice to face      Return in about 6 weeks (around 3/16/2022) for Follow up with CAMILLE Rg.      CAMILLE Gonzalez  02/02/22  10:49 CST

## 2022-03-21 ENCOUNTER — OFFICE VISIT (OUTPATIENT)
Dept: OTOLARYNGOLOGY | Facility: CLINIC | Age: 67
End: 2022-03-21

## 2022-03-21 VITALS — TEMPERATURE: 97.2 F | SYSTOLIC BLOOD PRESSURE: 132 MMHG | DIASTOLIC BLOOD PRESSURE: 84 MMHG | HEART RATE: 69 BPM

## 2022-03-21 DIAGNOSIS — S02.2XXA CLOSED FRACTURE OF NASAL BONE, INITIAL ENCOUNTER: Primary | ICD-10-CM

## 2022-03-21 DIAGNOSIS — J34.3 HYPERTROPHY OF INFERIOR NASAL TURBINATE: ICD-10-CM

## 2022-03-21 PROCEDURE — 99214 OFFICE O/P EST MOD 30 MIN: CPT | Performed by: EMERGENCY MEDICINE

## 2022-03-21 RX ORDER — FLUTICASONE PROPIONATE 50 MCG
2 SPRAY, SUSPENSION (ML) NASAL DAILY
Qty: 16 G | Refills: 11 | Status: SHIPPED | OUTPATIENT
Start: 2022-03-21 | End: 2022-12-05

## 2022-03-21 RX ORDER — OXYMETAZOLINE HYDROCHLORIDE 0.05 G/100ML
2 SPRAY NASAL 2 TIMES DAILY
Qty: 30 ML | Refills: 0 | Status: SHIPPED | OUTPATIENT
Start: 2022-03-21 | End: 2022-03-24

## 2022-03-21 NOTE — PROGRESS NOTES
CAMILLE Gonzalez ENT [facility] EAR NOSE & THROAT  [address], [address]  [place] [address]  Fax [phone]  Phone [phone]      Visit Type: FOLLOW UP   Chief Complaint   Patient presents with   • Nasal Congestion     Nasal fracture        HPI  She presents for a follow up evaluation. She has had continued problems with nasal congestion.      Past Medical History:   Diagnosis Date   • Disease of thyroid gland    • Hyperlipidemia    • Hypertension    • RA (rheumatoid arthritis) (HCC)        Past Surgical History:   Procedure Laterality Date   • COLONOSCOPY  2017   • DILATATION AND CURETTAGE     • TONSILLECTOMY     • TUBAL ABDOMINAL LIGATION     • WISDOM TOOTH EXTRACTION         Family History: Her family history includes Colon cancer (age of onset: 18) in her cousin; Colon cancer (age of onset: 80) in her maternal grandmother; Diabetes in her father; Heart disease in her mother; No Known Problems in her brother.     Social History: She  reports that she has never smoked. She has never used smokeless tobacco. She reports that she does not drink alcohol and does not use drugs.    Home Medications:  Calcium Carbonate, Multiple Vitamins-Minerals, Suvorexant, Tofacitinib Citrate ER, Vitamin D (Cholecalciferol), aspirin, bisoprolol-hydrochlorothiazide, esomeprazole, ferrous sulfate, fluticasone, levothyroxine, mupirocin, nystatin-triamcinolone, oxymetazoline, and simvastatin    Allergies:  She is allergic to contrast dye, demerol [meperidine], and mobic [meloxicam].       Vital Signs:   Temp:  [97.2 °F (36.2 °C)] 97.2 °F (36.2 °C)  Heart Rate:  [69] 69  BP: (132)/(84) 132/84  ENT Physical Exam  Constitutional  Appearance: patient appears well-developed, well-nourished and well-groomed,  Communication/Voice: communication appropriate for developmental age; vocal quality normal;  Head and Face  Appearance: head appears normal, face appears normal and  face appears atraumatic;  Palpation: facial palpation normal;  Salivary: glands normal;  Ear  Hearing: intact;  Nose  Internal Nose: bilateral intranasal mucosa edematous and erythematous; nasal septal deviation present; bilateral inferior turbinates with hypertrophy;         Result Review    RESULTS REVIEW    I have reviewed the patients old records in the chart.     Assessment/Plan    Diagnoses and all orders for this visit:    1. Closed fracture of nasal bone, initial encounter (Primary)    2. Hypertrophy of inferior nasal turbinate    Other orders  -     mupirocin (BACTROBAN) 2 % nasal ointment; into the nostril(s) as directed by provider 2 (Two) Times a Day for 14 days. Apply to the nose twice daily  Dispense: 3 g; Refill: 0  -     fluticasone (FLONASE) 50 MCG/ACT nasal spray; 2 sprays into the nostril(s) as directed by provider Daily.  Dispense: 16 g; Refill: 11  -     oxymetazoline (Afrin Nasal Spray) 0.05 % nasal spray; 2 sprays into the nostril(s) as directed by provider 2 (Two) Times a Day for 3 days.  Dispense: 30 mL; Refill: 0            We will do afrin for 3 days, start flonase, continue saline rinses, and mupirocin x2 weeks.  Follow up with Dr. Soto to discuss possible turbinate reduction if congestion persists.      Return in about 4 months (around 7/21/2022) for Follow up with Dr. Soto.      Kaitlynn Palacios, CAMILLE  03/21/22  10:36 CDT

## 2022-03-21 NOTE — PATIENT INSTRUCTIONS
CONTACT INFORMATION:  The main office phone number is [phone]. For emergencies after hours and on weekends, this number will convert over to our answering service and the on call provider will answer. Please try to keep non emergent phone calls/ questions to office hours 9am-5pm Monday through Friday.     Celly  As an alternative, you can sign up and use the Epic MyChart system for more direct and quicker access for non emergent questions/ problems.  [employer]bit allows you to send messages to your doctor, view your test results, renew your prescriptions, schedule appointments, and more. To sign up, go to spotdock and click on the Sign Up Now link in the New User? box. Enter your Celly Activation Code exactly as it appears below along with the last four digits of your Social Security Number and your Date of Birth () to complete the sign-up process. If you do not sign up before the expiration date, you must request a new code.    Celly Activation Code: Activation code not generated  Current Celly Status: Active    If you have questions, you can email [email or web address] or call [phone] to talk to our Celly staff. Remember, Celly is NOT to be used for urgent needs. For medical emergencies, dial 911.      Nosebleed Fact Sheet    Nosebleeds are a common problem that we see in our clinic and can occur in any age group.  They can range from spotty bleeding to episodes of uncontrolled profuse bleeding.  Multiple medical conditions can contribute to nosebleeds and fortunately most of these can be controlled to limit and/or eliminate these bleeding episodes.    Most commonly bleeding occurs in the anterior or front part of the nose on the septum, or center wall of the nose.  This is because this area has several blood vessels vulnerable to both the drying effect of breathing, and to finger trauma of nose picking.  When this area become dry, the lining of the  nose in this area can crack and cause the blood vessels underneath to bleed.    The following condition can contribute to and cause nosebleeds:  High Blood Pressure - When the blood pressure is high, the increased pressure can cause blood to be more easily pushed through a damaged blood vessel.  If your blood pressure is uncontrolled over 140 systolic, you may need to consult your primary-care physician to help limit your nosebleeds.  Low Platelets and Blood Clotting Factors - Certain medical conditions such as cancer and bleeding disorders can interfere with the body’s ability to form blood clots.  This interferes with the body’s own ability to stop nosebleeds.  Medications - Aspirin and aspirin type products such as nonsteroidal anti-inflammatory medications can interfere with body’s ability to form blood clots.  Nonsteroidal anti-inflammatory medications include medicines like ibuprofen (Motrin), naprosyn (Aleve), and Goody’s and BC powder.  Several cold and flu remedies also include aspirin.  If there’s a question, please ask you doctor or pharmacist.  Recently, it has been shown that some herbal medications, such as high doses of Vitamin E, can also interfere with the body’s ability to form clots.  Often times, these types of medications need to be discontinued to help with nosebleed prevention.  Dryness - The nose needs to stay nice and moist to try to prevent any kind of cracking or injury to the nasal lining and underlying blood vessels.  The worst time of year for nosebleeds is in the wintertime when the humidity is low.  Occasionally, a nasal deviation can cause abnormal airflow that dries out an area on the septum and cause a nosebleed.  Trauma -One of the most common reasons for nosebleeds is trauma caused by nose picking or external injury.  If an area in your nose is irritated, scratching or picking it can cause it to easily bleed.  Recommendations for Preventing Nosebleeds:  Keep well hydrated by  drinking at least six to eight glasses of water a day.  Increase the moisture of the nose by placing Vaseline in the front of the nose twice a day and irrigating the nose with nasal saline spray often (every 1-2 hrs).  Hold on taking aspirin or aspirin type products.  If you have high blood pressure, make sure this is well controlled.  Avoid nose picking and other forms of nasal trauma.  What to Do if Your Nose Bleeds:  Spray Afrin or a similar 12 hr nasal decongestant into the side that is bleeding.  With your thumb and forefinger, grasp the fleshy part of the nose and hold firm pressure.  If the bleeding is on the front part of the nose, it should make it stop.  Hold this pressure for 5 minutes on the clock then release.  If the nose is still bleeding, repeat.  If the nose is still bleeding after trying this 2-3 times, you may need to go to the emergency room for evaluation.  Call your doctor or go to the emergency room if you cannot get the bleeding to stop or if you feel dizzy or lightheaded after a large bleed.     How To Use Nasal Sprays:     Prime the pump unit   You’ll need to prime your pump unit before taking your dose if:   You’ve never used it before.   You haven’t used it for 7 days or more.     Shake the bottle gently.   Remove the cover.   Put a finger on either side of the spray tip. Place your thumb underneath the bottle.   Point the spray tip away from you.   Press down and release the pump 6 times until a fine spray appears. Now your pump is ready to use.     Prepare to take your dose   Gently blow your nose.   Remove the cap from the bottle.   Put a finger on either side of the spray tip. Place your thumb underneath the bottle.   Keep the bottle upright.     Inhale your dose   Put the spray tip into 1 of your nostrils. Use the finger of your other hand to close off your other nostril.   Bend your head forward.   Start to breathe in through your nose. At the same time press down firmly 1 time on  the spray tip. Use your fingers to spray while your thumb keeps the bottle steady.   Breathe in gently through your nose. Breathe out through your mouth.   If you need to take 2 doses, pump the spray into that nostril again.     Repeat steps 1 - 5 for your other nostril.     After your dose   Wipe the spray tip with a clean tissue before replacing the cap on the bottle.   Keep the bottle covered when you’re not using it.     Important tips   Never use more than 2 sprays in each nostril per day.   If the spray tip gets clogged, don’t use anything pointed or sharp to try to clear it.   Clean the spray tip at least once a week. Take off the cover and gently pull up on the spray tip to remove it from the bottle. Wash the tip and the cover in warm water. If the tip gets clogged, soak it in warm water. Air dry the tip and and cap before putting them back on the bottle.

## 2022-04-13 ENCOUNTER — OFFICE VISIT (OUTPATIENT)
Dept: OBSTETRICS AND GYNECOLOGY | Facility: CLINIC | Age: 67
End: 2022-04-13

## 2022-04-13 VITALS
WEIGHT: 211 LBS | BODY MASS INDEX: 33.12 KG/M2 | SYSTOLIC BLOOD PRESSURE: 142 MMHG | DIASTOLIC BLOOD PRESSURE: 80 MMHG | HEIGHT: 67 IN

## 2022-04-13 DIAGNOSIS — N81.10 FEMALE CYSTOCELE: ICD-10-CM

## 2022-04-13 DIAGNOSIS — Z01.419 ENCOUNTER FOR GYNECOLOGICAL EXAMINATION WITHOUT ABNORMAL FINDING: Primary | ICD-10-CM

## 2022-04-13 DIAGNOSIS — Z78.9 NONSMOKER: ICD-10-CM

## 2022-04-13 DIAGNOSIS — E66.9 OBESITY (BMI 30-39.9): ICD-10-CM

## 2022-04-13 DIAGNOSIS — N36.8 URETHRAL PROLAPSE: ICD-10-CM

## 2022-04-13 DIAGNOSIS — Z78.0 MENOPAUSE: ICD-10-CM

## 2022-04-13 PROCEDURE — 99397 PER PM REEVAL EST PAT 65+ YR: CPT | Performed by: OBSTETRICS & GYNECOLOGY

## 2022-04-13 RX ORDER — TRAZODONE HYDROCHLORIDE 50 MG/1
50 TABLET ORAL
COMMUNITY
Start: 2022-03-13

## 2022-04-13 RX ORDER — DICLOFENAC SODIUM 75 MG/1
75 TABLET, DELAYED RELEASE ORAL 2 TIMES DAILY
COMMUNITY
Start: 2022-03-13

## 2022-04-13 RX ORDER — TRAZODONE HYDROCHLORIDE 100 MG/1
100 TABLET ORAL NIGHTLY PRN
COMMUNITY
Start: 2022-01-07

## 2022-04-13 RX ORDER — LEVOTHYROXINE SODIUM 50 UG/1
50 TABLET ORAL DAILY
COMMUNITY
Start: 2022-04-06

## 2022-04-13 RX ORDER — MELATONIN
COMMUNITY
Start: 2022-01-06

## 2022-04-13 NOTE — PROGRESS NOTES
"Subjective   Chief Complaint   Patient presents with   • Annual Exam     Pt here today for annual exam. Pt voices having no hx of abnormal paps. Pt voices no concerns.      Carissa Becerril is a 67 y.o. year old  menopausal female presenting to be seen for her annual exam.  Overall, the patient reports to be \"feeling good except for some stiffness from RA\".  The patient denies any vaginal bleeding in the past 12 months.  Hot flashes and night sweats are not a significant problem.    She is sexually active.  In the past year there has not been new sexual partners.  Patient denies any pain or problems.  Patient reports regular self breast exams: no, but does so occasionally  She exercises regularly: yes, but activity limited by RA.  She does chair yoga  She has noticed changes in height: no.    No Additional Complaints Reported    The following portions of the patient's history were reviewed and updated as appropriate:problem list, current medications, allergies, past family history, past medical history, past social history and past surgical history.  Social History    Tobacco Use      Smoking status: Never Smoker      Smokeless tobacco: Never Used    Review of Systems   Constitutional: Positive for unexpected weight change (has gained weight during pandemic, partially due to steroids for RA). Negative for activity change.   HENT: Negative for congestion.    Respiratory: Negative for shortness of breath.    Cardiovascular: Negative for chest pain.   Gastrointestinal: Negative for abdominal pain, blood in stool, constipation and diarrhea.        Colonoscopy in 2017, due in 10 years   Endocrine: Negative for cold intolerance and heat intolerance.   Genitourinary: Negative for difficulty urinating, dyspareunia, enuresis, frequency, pelvic pain, urgency, vaginal bleeding, vaginal discharge and vaginal pain.   Musculoskeletal: Positive for arthralgias. Negative for back pain, neck pain and neck stiffness.   Skin: " Addended by: Nadja Noel on: 4/29/2021 10:23 AM     Modules accepted: Orders "Negative for rash.   Neurological: Negative for dizziness and headaches.   Psychiatric/Behavioral: Negative for dysphoric mood and sleep disturbance. The patient is not nervous/anxious.          Objective   /80   Ht 170.2 cm (67\")   Wt 95.7 kg (211 lb)   LMP 10/01/2009 (Approximate)   BMI 33.05 kg/m²   Physical Exam  Vitals and nursing note reviewed. Exam conducted with a chaperone present.   Constitutional:       General: She is not in acute distress.     Appearance: She is well-developed.   HENT:      Head: Normocephalic and atraumatic.   Neck:      Thyroid: No thyromegaly.   Cardiovascular:      Rate and Rhythm: Normal rate and regular rhythm.      Heart sounds: No murmur heard.  Pulmonary:      Effort: Pulmonary effort is normal.      Breath sounds: Normal breath sounds.   Chest:   Breasts:      Right: No inverted nipple or mass.      Left: No inverted nipple or mass.       Abdominal:      General: There is no distension.      Palpations: Abdomen is soft.      Tenderness: There is no abdominal tenderness.   Genitourinary:     General: Normal vulva.      Labia:         Right: No tenderness or lesion.         Left: No tenderness or lesion.       Vagina: Normal. No vaginal discharge or bleeding.      Cervix: No cervical motion tenderness or discharge.      Adnexa:         Right: No tenderness or fullness.          Left: No tenderness or fullness.        Rectum: No external hemorrhoid or internal hemorrhoid. Normal anal tone.      Comments: Labia normal, no lesions noted.  Urethral meatus unremarkable, no prolapse of mucosa.  Anus/perineum normal.  Grade II cystocele  Musculoskeletal:         General: Normal range of motion.      Cervical back: Normal range of motion and neck supple.   Skin:     General: Skin is warm and dry.   Neurological:      Mental Status: She is alert and oriented to person, place, and time.   Psychiatric:         Mood and Affect: Mood normal.         Behavior: Behavior normal.       "   Thought Content: Thought content normal.         Judgment: Judgment normal.            Assessment & Plan    Diagnoses and all orders for this visit:    1. Encounter for gynecological examination without abnormal finding (Primary): Patient doing well.  Exam unremarkable.  Routine screening labs with PCP, who also manages RA and hypertension.  Patient just recently had a normal mammogram; regular SBE encouraged.  Colonoscopy was done in 2020    2. Female cystocele: Grade II, asymptomatic    3. Nonsmoker    4. Obesity (BMI 30-39.9): Exercise discussed, but patient limited by RA    5. Menopause: only occasional vasomotor symptoms    6. Urethral prolapse: mild, asymptomatic      This note was electronically signed.    Darlene Sebastian MD  4/13/2022  10:35 CDT

## 2022-12-05 PROCEDURE — 87636 SARSCOV2 & INF A&B AMP PRB: CPT | Performed by: FAMILY MEDICINE

## 2023-05-02 ENCOUNTER — TRANSCRIBE ORDERS (OUTPATIENT)
Dept: ADMINISTRATIVE | Facility: HOSPITAL | Age: 68
End: 2023-05-02
Payer: MEDICARE

## 2023-05-02 DIAGNOSIS — Z12.31 SCREENING MAMMOGRAM, ENCOUNTER FOR: Primary | ICD-10-CM

## 2023-05-19 ENCOUNTER — HOSPITAL ENCOUNTER (OUTPATIENT)
Dept: MAMMOGRAPHY | Facility: HOSPITAL | Age: 68
Discharge: HOME | End: 2023-05-19
Admitting: OBSTETRICS & GYNECOLOGY
Payer: MEDICARE

## 2023-05-19 DIAGNOSIS — Z12.31 SCREENING MAMMOGRAM, ENCOUNTER FOR: ICD-10-CM

## 2023-05-19 PROCEDURE — 77063 BREAST TOMOSYNTHESIS BI: CPT

## 2023-05-19 PROCEDURE — 77067 SCR MAMMO BI INCL CAD: CPT

## 2023-10-03 ENCOUNTER — OFFICE VISIT (OUTPATIENT)
Dept: OBSTETRICS AND GYNECOLOGY | Facility: CLINIC | Age: 68
End: 2023-10-03
Payer: MEDICARE

## 2023-10-03 VITALS
WEIGHT: 199 LBS | SYSTOLIC BLOOD PRESSURE: 140 MMHG | HEIGHT: 67 IN | BODY MASS INDEX: 31.23 KG/M2 | DIASTOLIC BLOOD PRESSURE: 82 MMHG

## 2023-10-03 DIAGNOSIS — E66.9 OBESITY (BMI 30-39.9): ICD-10-CM

## 2023-10-03 DIAGNOSIS — N81.10 FEMALE CYSTOCELE: ICD-10-CM

## 2023-10-03 DIAGNOSIS — Z78.0 MENOPAUSE: ICD-10-CM

## 2023-10-03 DIAGNOSIS — Z01.419 ENCOUNTER FOR GYNECOLOGICAL EXAMINATION WITHOUT ABNORMAL FINDING: Primary | ICD-10-CM

## 2023-10-03 DIAGNOSIS — Z78.9 NONSMOKER: ICD-10-CM

## 2023-10-03 RX ORDER — ALPRAZOLAM 0.5 MG/1
0.5 TABLET ORAL 2 TIMES DAILY PRN
COMMUNITY

## 2023-10-03 RX ORDER — ACETAMINOPHEN 160 MG
1000 TABLET,DISINTEGRATING ORAL
COMMUNITY
End: 2023-10-03 | Stop reason: SDUPTHER

## 2023-10-03 NOTE — PROGRESS NOTES
"Subjective   Chief Complaint   Patient presents with    Annual Exam     Pt here today for annual exam. Pt c/o right sided breast pain. Pt voices no other concerns.      Carissa Becerril is a 68 y.o. year old  menopausal female presenting to be seen for her annual exam.  Overall, the patient reports to be \"feeling good, actually\".  The patient denies any vaginal bleeding in the past 12 months.  Hot flashes and night sweats usually are not a problem, but she has had just a few lately.    She is sexually active.  In the past year there has not been new sexual partners.  Patient denies any pain or problems.  Patient reports regular self breast exams: yes, and been having some tenderness in R breast lately.  Patient just had a normal mammogram 4 months ago.  She exercises regularly: yes.  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    No Additional Complaints Reported    The following portions of the patient's history were reviewed and updated as appropriate:problem list, current medications, allergies, past family history, past medical history, past social history and past surgical history.  Social History    Tobacco Use      Smoking status: Never      Smokeless tobacco: Never    Review of Systems   Constitutional:  Positive for unexpected weight change (has gained weight during pandemic, partially due to steroids for RA). Negative for activity change.   HENT:  Negative for congestion.    Respiratory:  Negative for shortness of breath.    Cardiovascular:  Negative for chest pain.   Gastrointestinal:  Negative for abdominal pain, blood in stool, constipation and diarrhea.        Colonoscopy in 2017, due in 10 years   Endocrine: Negative for cold intolerance and heat intolerance (has been getting hot more-easily lately).   Genitourinary:  Negative for difficulty urinating, dyspareunia, dysuria, enuresis, frequency, pelvic pain, urgency, vaginal bleeding, vaginal discharge " "and vaginal pain.   Musculoskeletal:  Positive for arthralgias (has both RA and osteoarthritis). Negative for back pain, neck pain and neck stiffness.   Skin:  Negative for rash.   Neurological:  Negative for dizziness and headaches.   Psychiatric/Behavioral:  Negative for dysphoric mood and sleep disturbance. The patient is not nervous/anxious.        Objective   /82   Ht 170.2 cm (67\")   Wt 90.3 kg (199 lb)   LMP 10/01/2009 (Approximate)   BMI 31.17 kg/m²   Physical Exam  Vitals and nursing note reviewed. Exam conducted with a chaperone present.   Constitutional:       General: She is not in acute distress.     Appearance: She is well-developed.   HENT:      Head: Normocephalic and atraumatic.   Neck:      Thyroid: No thyromegaly.   Cardiovascular:      Rate and Rhythm: Normal rate and regular rhythm.      Heart sounds: No murmur heard.  Pulmonary:      Effort: Pulmonary effort is normal.      Breath sounds: Normal breath sounds.   Chest:   Breasts:     Right: No inverted nipple or mass.      Left: No inverted nipple or mass.   Abdominal:      General: There is no distension.      Palpations: Abdomen is soft.      Tenderness: There is no abdominal tenderness.   Genitourinary:     General: Normal vulva.      Labia:         Right: No tenderness or lesion.         Left: No tenderness or lesion.       Vagina: Normal. No vaginal discharge or bleeding.      Cervix: No cervical motion tenderness or discharge.      Adnexa:         Right: No tenderness or fullness.          Left: No tenderness or fullness.        Rectum: No external hemorrhoid or internal hemorrhoid. Normal anal tone.      Comments: Labia normal, no lesions noted.  Urethral meatus unremarkable, no prolapse of mucosa.  Anus/perineum normal.  Grade II cystocele  Musculoskeletal:         General: Normal range of motion.      Cervical back: Normal range of motion and neck supple.   Skin:     General: Skin is warm and dry.   Neurological:      Mental " "Status: She is alert and oriented to person, place, and time.   Psychiatric:         Mood and Affect: Mood normal.         Behavior: Behavior normal.         Thought Content: Thought content normal.         Judgment: Judgment normal.          Assessment & Plan    Diagnoses and all orders for this visit:    1. Encounter for gynecological examination without abnormal finding (Primary): Patient doing well.  Exam unremarkable.  Routine screening labs with PCP, who also manages RA and hypertension.  Patient just had a normal mammogram 4 months ago; need for regular SBE emphasized.  Colonoscopy was done in 2020.  DEXA with osteopenia in 2023 (done at [facility]).  PAP not indicated since patient over 65 (she recalls two \"abnormal's many years ago, but did not ever need any procedures)    2. Female cystocele: Grade II, still asymptomatic    3. Nonsmoker    4. Obesity (BMI 30-39.9): Exercising quite a bit doing yard work.     5. Menopause: only occasional vasomotor symptoms    6. Urethral prolapse: noted last year, but not on today's exam      This note was electronically signed.    Darlene Sebastian MD  10/3/2023  11:37 CDT  "

## 2023-11-22 ENCOUNTER — TELEPHONE (OUTPATIENT)
Dept: URGENT CARE | Facility: CLINIC | Age: 68
End: 2023-11-22
Payer: MEDICARE

## 2023-11-22 DIAGNOSIS — J21.0 RSV (ACUTE BRONCHIOLITIS DUE TO RESPIRATORY SYNCYTIAL VIRUS): Primary | ICD-10-CM

## 2023-11-22 PROCEDURE — 87637 SARSCOV2&INF A&B&RSV AMP PRB: CPT | Performed by: NURSE PRACTITIONER

## 2023-11-22 RX ORDER — PREDNISONE 10 MG/1
TABLET ORAL DAILY
Qty: 21 EACH | Refills: 0 | Status: SHIPPED | OUTPATIENT
Start: 2023-11-22

## 2024-07-25 ENCOUNTER — TRANSCRIBE ORDERS (OUTPATIENT)
Dept: ADMINISTRATIVE | Facility: HOSPITAL | Age: 69
End: 2024-07-25
Payer: MEDICARE

## 2024-07-25 DIAGNOSIS — Z12.31 ENCOUNTER FOR SCREENING MAMMOGRAM FOR MALIGNANT NEOPLASM OF BREAST: Primary | ICD-10-CM

## 2024-08-02 ENCOUNTER — HOSPITAL ENCOUNTER (OUTPATIENT)
Dept: MAMMOGRAPHY | Facility: HOSPITAL | Age: 69
Discharge: HOME | End: 2024-08-02
Admitting: FAMILY MEDICINE
Payer: MEDICARE

## 2024-08-02 DIAGNOSIS — Z12.31 ENCOUNTER FOR SCREENING MAMMOGRAM FOR MALIGNANT NEOPLASM OF BREAST: ICD-10-CM

## 2024-08-02 LAB
NCCN CRITERIA FLAG: NORMAL
TYRER CUZICK SCORE: 6

## 2024-08-02 PROCEDURE — 77063 BREAST TOMOSYNTHESIS BI: CPT

## 2024-08-02 PROCEDURE — 77067 SCR MAMMO BI INCL CAD: CPT

## 2025-08-05 ENCOUNTER — TRANSCRIBE ORDERS (OUTPATIENT)
Dept: ADMINISTRATIVE | Facility: HOSPITAL | Age: 70
End: 2025-08-05
Payer: MEDICARE

## 2025-08-05 DIAGNOSIS — Z12.31 ENCOUNTER FOR SCREENING MAMMOGRAM FOR MALIGNANT NEOPLASM OF BREAST: Primary | ICD-10-CM

## 2025-08-19 ENCOUNTER — HOSPITAL ENCOUNTER (OUTPATIENT)
Dept: MAMMOGRAPHY | Facility: HOSPITAL | Age: 70
Discharge: HOME | End: 2025-08-19
Admitting: FAMILY MEDICINE
Payer: MEDICARE

## 2025-08-19 DIAGNOSIS — Z12.31 ENCOUNTER FOR SCREENING MAMMOGRAM FOR MALIGNANT NEOPLASM OF BREAST: ICD-10-CM

## 2025-08-19 PROCEDURE — 77067 SCR MAMMO BI INCL CAD: CPT

## 2025-08-19 PROCEDURE — 77063 BREAST TOMOSYNTHESIS BI: CPT
